# Patient Record
Sex: FEMALE | Race: WHITE | Employment: PART TIME | URBAN - METROPOLITAN AREA
[De-identification: names, ages, dates, MRNs, and addresses within clinical notes are randomized per-mention and may not be internally consistent; named-entity substitution may affect disease eponyms.]

---

## 2017-05-29 ENCOUNTER — HOSPITAL ENCOUNTER (OUTPATIENT)
Age: 22
Discharge: HOME | End: 2017-05-29
Attending: EMERGENCY MEDICINE
Payer: MEDICAID

## 2017-05-29 ENCOUNTER — APPOINTMENT (OUTPATIENT)
Dept: GENERAL RADIOLOGY | Age: 22
End: 2017-05-29
Attending: EMERGENCY MEDICINE
Payer: MEDICAID

## 2017-05-29 VITALS
HEART RATE: 77 BPM | SYSTOLIC BLOOD PRESSURE: 104 MMHG | RESPIRATION RATE: 18 BRPM | HEIGHT: 60 IN | WEIGHT: 118 LBS | OXYGEN SATURATION: 100 % | TEMPERATURE: 99 F | BODY MASS INDEX: 23.16 KG/M2 | DIASTOLIC BLOOD PRESSURE: 70 MMHG

## 2017-05-29 DIAGNOSIS — S63.501A RIGHT WRIST SPRAIN, INITIAL ENCOUNTER: Primary | ICD-10-CM

## 2017-05-29 PROCEDURE — 99213 OFFICE O/P EST LOW 20 MIN: CPT

## 2017-05-29 PROCEDURE — 99214 OFFICE O/P EST MOD 30 MIN: CPT

## 2017-05-29 PROCEDURE — 73110 X-RAY EXAM OF WRIST: CPT | Performed by: EMERGENCY MEDICINE

## 2017-05-29 RX ORDER — IBUPROFEN 600 MG/1
600 TABLET ORAL EVERY 8 HOURS PRN
Qty: 30 TABLET | Refills: 0 | Status: SHIPPED | OUTPATIENT
Start: 2017-05-29 | End: 2017-06-05

## 2017-05-29 NOTE — ED PROVIDER NOTES
Patient Seen in: [facility] Immediate Care In [address] [place] [address]    History   Patient presents with:  Upper Extremity Injury (musculoskeletal)    Stated Complaint: Rt Wrist Injury    HPI    Patient presents to the emergency department complaining of right w No        Physical Exam   Constitutional: She appears well-developed and well-nourished. No distress. Pulmonary/Chest: No respiratory distress. Musculoskeletal:   The right hand was examined.   There is tenderness over the ulnar metacarpal ligaments to

## 2018-08-07 ENCOUNTER — OFFICE VISIT (OUTPATIENT)
Dept: FAMILY MEDICINE CLINIC | Facility: CLINIC | Age: 23
End: 2018-08-07
Payer: COMMERCIAL

## 2018-08-07 VITALS
BODY MASS INDEX: 24.74 KG/M2 | HEART RATE: 70 BPM | DIASTOLIC BLOOD PRESSURE: 64 MMHG | TEMPERATURE: 98 F | RESPIRATION RATE: 16 BRPM | HEIGHT: 60 IN | SYSTOLIC BLOOD PRESSURE: 92 MMHG | OXYGEN SATURATION: 99 % | WEIGHT: 126 LBS

## 2018-08-07 DIAGNOSIS — N30.00 ACUTE CYSTITIS WITHOUT HEMATURIA: Primary | ICD-10-CM

## 2018-08-07 LAB
APPEARANCE: CLEAR
BILIRUBIN: NEGATIVE
GLUCOSE (URINE DIPSTICK): NEGATIVE MG/DL
KETONES (URINE DIPSTICK): NEGATIVE MG/DL
MULTISTIX LOT#: ABNORMAL NUMERIC
NITRITE, URINE: NEGATIVE
OCCULT BLOOD: NEGATIVE
PH, URINE: 7 (ref 4.5–8)
PROTEIN (URINE DIPSTICK): 30 MG/DL
SPECIFIC GRAVITY: 1.02 (ref 1–1.03)
URINE-COLOR: YELLOW
UROBILINOGEN,SEMI-QN: 0.2 MG/DL (ref 0–1.9)

## 2018-08-07 PROCEDURE — 99203 OFFICE O/P NEW LOW 30 MIN: CPT | Performed by: PHYSICIAN ASSISTANT

## 2018-08-07 PROCEDURE — 81003 URINALYSIS AUTO W/O SCOPE: CPT | Performed by: PHYSICIAN ASSISTANT

## 2018-08-07 PROCEDURE — 87086 URINE CULTURE/COLONY COUNT: CPT | Performed by: PHYSICIAN ASSISTANT

## 2018-08-07 RX ORDER — PHENAZOPYRIDINE HYDROCHLORIDE 200 MG/1
200 TABLET, FILM COATED ORAL 3 TIMES DAILY PRN
Qty: 6 TABLET | Refills: 0 | Status: SHIPPED | OUTPATIENT
Start: 2018-08-07 | End: 2018-08-09

## 2018-08-07 RX ORDER — CIPROFLOXACIN 500 MG/1
500 TABLET, FILM COATED ORAL 2 TIMES DAILY
Qty: 14 TABLET | Refills: 0 | Status: SHIPPED | OUTPATIENT
Start: 2018-08-07 | End: 2018-08-14

## 2018-08-07 NOTE — PROGRESS NOTES
CHIEF COMPLAINT:   Patient presents with:  UTI      HPI:   Carissa Simeon is a 21year old female who presents with symptoms of UTI. Complaining of urinary frequency, urgency, dysuria for last 7 days. +dark urine. Mild lower back pain.  No abdominal jazlyn hour(s))  -URINALYSIS, AUTO, W/O SCOPE   Collection Time: 08/07/18 12:27 PM   Result Value Ref Range   GLUCOSE (URINE DIPSTICK) negative mg/dL   BILIRUBIN negative Negative   KETONES (URINE DIPSTICK) negative Negative mg/dL   SPECIFIC GRAVITY 1.025 1.005 -

## 2019-02-26 ENCOUNTER — OFFICE VISIT (OUTPATIENT)
Dept: FAMILY MEDICINE CLINIC | Facility: CLINIC | Age: 24
End: 2019-02-26
Payer: COMMERCIAL

## 2019-02-26 VITALS
BODY MASS INDEX: 26 KG/M2 | SYSTOLIC BLOOD PRESSURE: 105 MMHG | WEIGHT: 131 LBS | RESPIRATION RATE: 16 BRPM | OXYGEN SATURATION: 99 % | DIASTOLIC BLOOD PRESSURE: 70 MMHG | HEART RATE: 77 BPM | TEMPERATURE: 98 F

## 2019-02-26 DIAGNOSIS — A08.4 VIRAL GASTROENTERITIS: Primary | ICD-10-CM

## 2019-02-26 DIAGNOSIS — J02.9 SORE THROAT: ICD-10-CM

## 2019-02-26 LAB
CONTROL LINE PRESENT WITH A CLEAR BACKGROUND (YES/NO): YES YES/NO
STREP GRP A CUL-SCR: NEGATIVE

## 2019-02-26 PROCEDURE — 99213 OFFICE O/P EST LOW 20 MIN: CPT | Performed by: NURSE PRACTITIONER

## 2019-02-26 PROCEDURE — 87081 CULTURE SCREEN ONLY: CPT | Performed by: NURSE PRACTITIONER

## 2019-02-26 PROCEDURE — 87880 STREP A ASSAY W/OPTIC: CPT | Performed by: NURSE PRACTITIONER

## 2019-02-26 NOTE — PROGRESS NOTES
CHIEF COMPLAINT:   Patient presents with:  Vomiting  Sore Throat      HPI:   Percy Vargas is a 21year old female who presents for complaints of vomiting, sore throat, and diarrhea for 1 day. Reports generalized mild cramping abdominal pain.   Report RESPIRATORY: denies shortness of breath, cough or wheezing  GI:See HPI  NEURO: denies headaches, loss of bowel or bladder control    EXAM:   /70   Pulse 77   Temp 97.8 °F (36.6 °C)   Resp 16   Wt 131 lb   LMP 02/12/2019   SpO2 99%   BMI 25.58 kg/m² Gastroenteritis is commonly called the stomach flu. It is most often caused by a virus that affects the stomach and intestinal tract and usually lasts from 2 to 7 days. Common viruses causing gastroenteritis include norovirus, rotavirus, and hepatitis A.  Tana Villatoro You may use acetaminophen or NSAID medicines like ibuprofen or naproxen to control fever unless another medicine was given. If you have chronic liver or kidney disease, talk with your healthcare provider before using these medicines.  Also talk with your pr · Limit fat intake to less than 15 grams per day. Do this by avoiding margarine, butter, oils, mayonnaise, sauces, gravies, fried foods, peanut butter, meat, poultry, and fish.   · Limit fiber and avoid raw or cooked vegetables, fresh fruits (except bananas · Try to drink lots of fluids. Start with small sips of fluid every 5 minutes, as long as you can keep fluid down, and increase slowly as tolerated. · Avoid soda and full strength fruit juices.   Try water, 1/2 strength apple juice, Pedialyte, and/or 1/

## 2019-02-26 NOTE — PATIENT INSTRUCTIONS
Viral Gastroenteritis (Adult)    Gastroenteritis is commonly called the stomach flu. It is most often caused by a virus that affects the stomach and intestinal tract and usually lasts from 2 to 7 days.  Common viruses causing gastroenteritis include norov You may use acetaminophen or NSAID medicines like ibuprofen or naproxen to control fever unless another medicine was given. If you have chronic liver or kidney disease, talk with your healthcare provider before using these medicines.  Also talk with your pr · Limit fat intake to less than 15 grams per day. Do this by avoiding margarine, butter, oils, mayonnaise, sauces, gravies, fried foods, peanut butter, meat, poultry, and fish.   · Limit fiber and avoid raw or cooked vegetables, fresh fruits (except bananas · Try to drink lots of fluids. Start with small sips of fluid every 5 minutes, as long as you can keep fluid down, and increase slowly as tolerated. · Avoid soda and full strength fruit juices.   Try water, 1/2 strength apple juice, Pedialyte, and/or 1/

## 2020-02-11 ENCOUNTER — OFFICE VISIT (OUTPATIENT)
Dept: NEUROLOGY | Facility: CLINIC | Age: 25
End: 2020-02-11
Payer: COMMERCIAL

## 2020-02-11 ENCOUNTER — TELEPHONE (OUTPATIENT)
Dept: NEUROLOGY | Facility: CLINIC | Age: 25
End: 2020-02-11

## 2020-02-11 VITALS
BODY MASS INDEX: 28 KG/M2 | DIASTOLIC BLOOD PRESSURE: 60 MMHG | SYSTOLIC BLOOD PRESSURE: 124 MMHG | RESPIRATION RATE: 16 BRPM | HEART RATE: 72 BPM | WEIGHT: 142 LBS

## 2020-02-11 DIAGNOSIS — G43.009 MIGRAINE WITHOUT AURA AND WITHOUT STATUS MIGRAINOSUS, NOT INTRACTABLE: Primary | ICD-10-CM

## 2020-02-11 DIAGNOSIS — F51.01 PRIMARY INSOMNIA: ICD-10-CM

## 2020-02-11 DIAGNOSIS — R53.82 CHRONIC FATIGUE: ICD-10-CM

## 2020-02-11 DIAGNOSIS — M54.2 NECK PAIN: ICD-10-CM

## 2020-02-11 DIAGNOSIS — G44.209 TENSION HEADACHE: ICD-10-CM

## 2020-02-11 PROCEDURE — 99204 OFFICE O/P NEW MOD 45 MIN: CPT | Performed by: OTHER

## 2020-02-11 RX ORDER — MULTIVITAMIN
TABLET ORAL
COMMUNITY

## 2020-02-11 RX ORDER — AMITRIPTYLINE HYDROCHLORIDE 10 MG/1
10 TABLET, FILM COATED ORAL NIGHTLY
Qty: 30 TABLET | Refills: 3 | Status: SHIPPED | OUTPATIENT
Start: 2020-02-11 | End: 2020-03-05

## 2020-02-11 RX ORDER — SUMATRIPTAN 50 MG/1
50 TABLET, FILM COATED ORAL EVERY 2 HOUR PRN
Qty: 12 TABLET | Refills: 3 | Status: SHIPPED | OUTPATIENT
Start: 2020-02-11 | End: 2020-03-05

## 2020-02-11 NOTE — PROGRESS NOTES
[facility]   Neurology; INITIAL CLINIC VISIT  2020, 2:39 PM     Cindy Gutierrez Patient Status:  No patient class for patient encounter    1995 MRN [id number]   Location [facility] denies chest pain, no palpitations   GI: denies nausea, vomiting, constipation, diarrhea; no rectal bleeding; no heartburn  GENITAL/: no dysuria, urgency or frequency;  no hernias; no nocturia  GYNE/: no dysuria, urgency or frequency; no urinary incont shrug  Motor:  No drift, rapid finger movement symmetric. 5/5 in all limbs with normal tone. No tremor of any kind;   Sensory; Intact to light touch, temperature, vibration and proprioception;  DTRs;   Symmetric in both arms and legs, including biceps, tri

## 2020-02-11 NOTE — PROGRESS NOTES
The patient states she is having headaches daily. Denies sensitives. Patient is noticing slurring of her words. She is also having pins and needles feeling in the neck and back of the head during her headaches. Patient states headaches started at 12.  Loyce Vidal

## 2020-02-20 ENCOUNTER — OFFICE VISIT (OUTPATIENT)
Dept: FAMILY MEDICINE CLINIC | Facility: CLINIC | Age: 25
End: 2020-02-20
Payer: COMMERCIAL

## 2020-02-20 VITALS
HEIGHT: 60 IN | RESPIRATION RATE: 18 BRPM | BODY MASS INDEX: 26.31 KG/M2 | TEMPERATURE: 98 F | OXYGEN SATURATION: 98 % | WEIGHT: 134 LBS | HEART RATE: 87 BPM | SYSTOLIC BLOOD PRESSURE: 107 MMHG | DIASTOLIC BLOOD PRESSURE: 76 MMHG

## 2020-02-20 DIAGNOSIS — A08.4 VIRAL GASTROENTERITIS: Primary | ICD-10-CM

## 2020-02-20 PROCEDURE — 99213 OFFICE O/P EST LOW 20 MIN: CPT | Performed by: NURSE PRACTITIONER

## 2020-02-20 RX ORDER — ONDANSETRON 4 MG/1
4 TABLET, ORALLY DISINTEGRATING ORAL EVERY 8 HOURS PRN
Qty: 9 TABLET | Refills: 0 | Status: SHIPPED | OUTPATIENT
Start: 2020-02-20 | End: 2020-02-23

## 2020-02-20 RX ORDER — TRETINOIN 0.5 MG/G
LOTION TOPICAL
COMMUNITY
Start: 2020-02-13

## 2020-02-20 NOTE — PROGRESS NOTES
CHIEF COMPLAINT:   Patient presents with:  Nausea/vomiting  Diarrhea      HPI:   Terese Bradley is a 25year old female who presents for complaints of diarrhea and vomiting for 2 days. Reports generalized mild cramping abdominal pain.   Reports vomited • SUMAtriptan Succinate (IMITREX) 50 MG Oral Tab Take 1 tablet (50 mg total) by mouth every 2 (two) hours as needed for Migraine. 12 tablet 3      History reviewed. No pertinent past medical history.    Social History:  Social History    Tobacco Use      Sm The patient is asked to f/u with PCP if symptoms worsen or if no improvement in 2-3 days. Discussed s/s that require ER visit.       Requested Prescriptions     Signed Prescriptions Disp Refills   • ondansetron 4 MG Oral Tablet Dispersible 9 tablet 0     S Gastroenteritis is commonly called the \"stomach flu,\" although it has nothing to do with influenza. It is most often caused by a virus that affects the stomach and intestinal tract and usually lasts from 2 to 7 days.  Common viruses causing gastroenteriti You may use acetaminophen or NSAID medicines like ibuprofen or naproxen to control fever unless another medicine was given. If you have chronic liver or kidney disease, talk with your healthcare provider before using these medicines.  Also talk with your pr · Limit fat intake to less than 15 grams per day. Do this by avoiding margarine, butter, oils, mayonnaise, sauces, gravies, fried foods, peanut butter, meat, poultry, and fish.   · Limit fiber and avoid raw or cooked vegetables, fresh fruits (except bananas

## 2020-02-20 NOTE — PATIENT INSTRUCTIONS
· May take Pepto-bismol or Imodium to slow diarrhea and minimize fluid loss through colon. · Rest as much as possible  · Try to drink lots of fluids. Start with small sips of water every 15 minutes as long as you can keep fluid down.     · Avoid fruit jui occurs, body fluids must be replaced. Antibiotics don't help with this illness because it is usually viral. Simple home treatment will be helpful.   Symptoms of viral gastroenteritis may include:  · Watery, loose stools  · Stomach pain or abdominal cramps medicine for vomiting or diarrhea are prescribed, take these only as directed. Nausea and diarrhea medicines are generally OK unless you have bleeding, fever, or severe abdominal pain.   Diet  Follow these guidelines for food:  · Water and liquids are impor better. Follow-up care  Follow up with your healthcare provider, or as advised. Call your provider if you don't get better within 24 hours or if diarrhea lasts more than a week. Also follow up if you are unable to keep down liquids and get dehydrated.  If

## 2020-03-05 ENCOUNTER — OFFICE VISIT (OUTPATIENT)
Dept: NEUROLOGY | Facility: CLINIC | Age: 25
End: 2020-03-05
Payer: COMMERCIAL

## 2020-03-05 VITALS
BODY MASS INDEX: 27 KG/M2 | SYSTOLIC BLOOD PRESSURE: 118 MMHG | HEART RATE: 66 BPM | WEIGHT: 138 LBS | RESPIRATION RATE: 14 BRPM | DIASTOLIC BLOOD PRESSURE: 70 MMHG

## 2020-03-05 DIAGNOSIS — G43.009 MIGRAINE WITHOUT AURA AND WITHOUT STATUS MIGRAINOSUS, NOT INTRACTABLE: Primary | ICD-10-CM

## 2020-03-05 DIAGNOSIS — F51.01 PRIMARY INSOMNIA: ICD-10-CM

## 2020-03-05 DIAGNOSIS — R53.82 CHRONIC FATIGUE: ICD-10-CM

## 2020-03-05 PROCEDURE — 99214 OFFICE O/P EST MOD 30 MIN: CPT | Performed by: OTHER

## 2020-03-05 RX ORDER — TOPIRAMATE 25 MG/1
25 TABLET ORAL 2 TIMES DAILY
Qty: 60 TABLET | Refills: 5 | Status: SHIPPED | OUTPATIENT
Start: 2020-03-05 | End: 2020-11-05

## 2020-03-05 RX ORDER — SUMATRIPTAN 50 MG/1
50 TABLET, FILM COATED ORAL EVERY 2 HOUR PRN
Qty: 12 TABLET | Refills: 3 | Status: SHIPPED | OUTPATIENT
Start: 2020-03-05 | End: 2021-12-23

## 2020-03-05 NOTE — PROGRESS NOTES
[facility]   Neurology; follow up  CLINIC VISIT  3/5/2020    Abdullahi Vicente Patient Status:  No patient class for patient encounter    1995 MRN [id number]   Location [address] [place] [address], [address] [place] [address], [address] [place] [address] PCP N Take 1 tablet (50 mg total) by mouth every 2 (two) hours as needed for Migraine. 12 tablet 3   • ALTRENO 0.05 % External Lotion      • Multiple Vitamin (MULTI-VITAMIN DAILY) Oral Tab Take by mouth.            REVIEW OF SYSTEMS:    GENERAL HEALTH:  feels wel well;  Memory and comprehension:  MMSE is normal,   Cranial Nerves       CN II:  Visual fields full, Pupils equal and reactive, Fundi normal       CN III, IV, VI:  Horrizontal and vertical movements normal.  Normal pursuit and saccades. questions and concerns were addressed to satisfactory status. The patient was instructed to go to local ER if current symptoms worse or significant new symptoms arise. They understood my instruction.      Sarah Gardiner MD  General Neurology   Neuromuscular/ El

## 2021-12-01 ENCOUNTER — OFFICE VISIT (OUTPATIENT)
Dept: DERMATOLOGY CLINIC | Facility: CLINIC | Age: 26
End: 2021-12-01
Payer: MEDICAID

## 2021-12-01 DIAGNOSIS — L70.0 ACNE VULGARIS: Primary | ICD-10-CM

## 2021-12-01 PROCEDURE — 99213 OFFICE O/P EST LOW 20 MIN: CPT | Performed by: PHYSICIAN ASSISTANT

## 2021-12-01 RX ORDER — CLINDAMYCIN PHOSPHATE 10 MG/ML
1 LOTION TOPICAL DAILY
Qty: 60 ML | Refills: 1 | Status: SHIPPED | OUTPATIENT
Start: 2021-12-01

## 2021-12-01 NOTE — PROGRESS NOTES
HPI:    Patient ID: Erasmo Esquivel is a 32year old female. Patient presents for acne for the past several months. Has been seen in dermatology before and was given acutane. Has noticed most of her acne is around the nose and lips. No draining.  No ten will comply with discussion above. No orders of the defined types were placed in this encounter.       Meds This Visit:  Requested Prescriptions     Signed Prescriptions Disp Refills   • clindamycin 1 % External Lotion 60 mL 1     Sig: Apply 1 Applicati

## 2021-12-07 ENCOUNTER — TELEPHONE (OUTPATIENT)
Dept: DERMATOLOGY CLINIC | Facility: CLINIC | Age: 26
End: 2021-12-07

## 2021-12-09 NOTE — TELEPHONE ENCOUNTER
Medication PA Requested:    Tretinoin 0.05% External Cream                                                       CoverMyMeds Used:  Key:  Quantity:  20g  Day Supply:  Sig:   DX Code:                                     CPT code (if applicable):   Case Number/Pending Ref#:       Medication PA Requested:   Clindamycin 1.0% External Lotion                                                         CoverMyMeds Used:  Key:  Quantity:  60 ml  Day Supply:  Sig:   DX Code:                                     CPT code (if applicable):   Case Number/Pending Ref#:     Thank you!

## 2021-12-14 NOTE — TELEPHONE ENCOUNTER
Fax from [facility] for clindamycin 1 % External Lotion    Denied Tretinoin 0.05 % External Cream    Placed faxes in pa inbox

## 2021-12-23 PROBLEM — M54.2 NECK PAIN: Status: RESOLVED | Noted: 2020-02-11 | Resolved: 2021-12-23

## 2021-12-23 PROBLEM — G43.009 MIGRAINE WITHOUT AURA: Status: RESOLVED | Noted: 2020-02-11 | Resolved: 2021-12-23

## 2022-10-10 ENCOUNTER — HOSPITAL ENCOUNTER (OUTPATIENT)
Age: 27
Discharge: HOME | End: 2022-10-10
Payer: COMMERCIAL

## 2022-10-10 VITALS
DIASTOLIC BLOOD PRESSURE: 62 MMHG | OXYGEN SATURATION: 98 % | TEMPERATURE: 97 F | WEIGHT: 130 LBS | HEIGHT: 60 IN | HEART RATE: 89 BPM | SYSTOLIC BLOOD PRESSURE: 148 MMHG | RESPIRATION RATE: 16 BRPM | BODY MASS INDEX: 25.52 KG/M2

## 2022-10-10 DIAGNOSIS — J02.0 STREP PHARYNGITIS: Primary | ICD-10-CM

## 2022-10-10 LAB — S PYO AG THROAT QL: POSITIVE

## 2022-10-10 PROCEDURE — 99213 OFFICE O/P EST LOW 20 MIN: CPT | Performed by: NURSE PRACTITIONER

## 2022-10-10 PROCEDURE — 87880 STREP A ASSAY W/OPTIC: CPT | Performed by: NURSE PRACTITIONER

## 2022-10-10 RX ORDER — PENICILLIN V POTASSIUM 500 MG/1
500 TABLET ORAL 2 TIMES DAILY
Qty: 20 TABLET | Refills: 0 | Status: SHIPPED | OUTPATIENT
Start: 2022-10-10 | End: 2022-10-20

## 2022-10-10 NOTE — ED INITIAL ASSESSMENT (HPI)
Pt reports sore throat, headache, and stiff neck which started Saturday morning. Febrile, took tylenol at 0500.

## 2022-10-17 ENCOUNTER — HOSPITAL ENCOUNTER (OUTPATIENT)
Age: 27
Discharge: HOME | End: 2022-10-17
Payer: COMMERCIAL

## 2022-10-17 ENCOUNTER — APPOINTMENT (OUTPATIENT)
Dept: GENERAL RADIOLOGY | Age: 27
End: 2022-10-17
Attending: NURSE PRACTITIONER
Payer: COMMERCIAL

## 2022-10-17 VITALS
OXYGEN SATURATION: 100 % | HEART RATE: 66 BPM | RESPIRATION RATE: 16 BRPM | DIASTOLIC BLOOD PRESSURE: 61 MMHG | TEMPERATURE: 98 F | SYSTOLIC BLOOD PRESSURE: 122 MMHG

## 2022-10-17 DIAGNOSIS — M54.50 BACK PAIN, LUMBOSACRAL: Primary | ICD-10-CM

## 2022-10-17 LAB
B-HCG UR QL: NEGATIVE
BILIRUB UR QL STRIP: NEGATIVE
CLARITY UR: CLEAR
COLOR UR: YELLOW
GLUCOSE UR STRIP-MCNC: NEGATIVE MG/DL
HGB UR QL STRIP: NEGATIVE
KETONES UR STRIP-MCNC: NEGATIVE MG/DL
LEUKOCYTE ESTERASE UR QL STRIP: NEGATIVE
NITRITE UR QL STRIP: NEGATIVE
PH UR STRIP: 5.5 [PH]
PROT UR STRIP-MCNC: NEGATIVE MG/DL
SP GR UR STRIP: 1.01
UROBILINOGEN UR STRIP-ACNC: <2 MG/DL

## 2022-10-17 PROCEDURE — 81025 URINE PREGNANCY TEST: CPT | Performed by: NURSE PRACTITIONER

## 2022-10-17 PROCEDURE — 99213 OFFICE O/P EST LOW 20 MIN: CPT | Performed by: NURSE PRACTITIONER

## 2022-10-17 PROCEDURE — 81002 URINALYSIS NONAUTO W/O SCOPE: CPT | Performed by: NURSE PRACTITIONER

## 2022-10-17 PROCEDURE — 72100 X-RAY EXAM L-S SPINE 2/3 VWS: CPT | Performed by: NURSE PRACTITIONER

## 2022-10-17 RX ORDER — IBUPROFEN 600 MG/1
600 TABLET ORAL EVERY 8 HOURS PRN
Qty: 30 TABLET | Refills: 0 | Status: SHIPPED | OUTPATIENT
Start: 2022-10-17 | End: 2022-10-24

## 2022-10-22 ENCOUNTER — HOSPITAL ENCOUNTER (EMERGENCY)
Facility: HOSPITAL | Age: 27
Discharge: HOME | End: 2022-10-22
Attending: EMERGENCY MEDICINE
Payer: COMMERCIAL

## 2022-10-22 VITALS
HEIGHT: 60 IN | RESPIRATION RATE: 16 BRPM | HEART RATE: 66 BPM | WEIGHT: 130 LBS | DIASTOLIC BLOOD PRESSURE: 64 MMHG | OXYGEN SATURATION: 98 % | TEMPERATURE: 98 F | SYSTOLIC BLOOD PRESSURE: 109 MMHG | BODY MASS INDEX: 25.52 KG/M2

## 2022-10-22 DIAGNOSIS — M54.42 ACUTE LEFT-SIDED LOW BACK PAIN WITH LEFT-SIDED SCIATICA: Primary | ICD-10-CM

## 2022-10-22 PROCEDURE — 99283 EMERGENCY DEPT VISIT LOW MDM: CPT

## 2022-10-22 RX ORDER — CYCLOBENZAPRINE HCL 10 MG
10 TABLET ORAL 3 TIMES DAILY PRN
Qty: 20 TABLET | Refills: 0 | Status: SHIPPED | OUTPATIENT
Start: 2022-10-22 | End: 2022-10-22

## 2022-10-22 RX ORDER — CYCLOBENZAPRINE HCL 10 MG
10 TABLET ORAL 3 TIMES DAILY PRN
Qty: 20 TABLET | Refills: 0 | Status: SHIPPED | OUTPATIENT
Start: 2022-10-22 | End: 2022-10-29

## 2022-10-22 RX ORDER — HYDROCODONE BITARTRATE AND ACETAMINOPHEN 5; 325 MG/1; MG/1
1 TABLET ORAL EVERY 8 HOURS PRN
Qty: 12 TABLET | Refills: 0 | Status: SHIPPED | OUTPATIENT
Start: 2022-10-22 | End: 2022-10-22

## 2022-10-22 RX ORDER — HYDROCODONE BITARTRATE AND ACETAMINOPHEN 5; 325 MG/1; MG/1
1 TABLET ORAL EVERY 8 HOURS PRN
Qty: 12 TABLET | Refills: 0 | Status: SHIPPED | OUTPATIENT
Start: 2022-10-22

## 2022-10-23 NOTE — ED INITIAL ASSESSMENT (HPI)
Pt reports low back pain for past week and a half, now reports pain radiating to L groin and down L leg.

## 2023-03-08 ENCOUNTER — TELEPHONE (OUTPATIENT)
Dept: OBGYN | Age: 28
End: 2023-03-08

## 2023-07-26 ENCOUNTER — HOSPITAL ENCOUNTER (EMERGENCY)
Facility: HOSPITAL | Age: 28
Discharge: HOME | End: 2023-07-26
Attending: EMERGENCY MEDICINE
Payer: COMMERCIAL

## 2023-07-26 ENCOUNTER — APPOINTMENT (OUTPATIENT)
Dept: GENERAL RADIOLOGY | Facility: HOSPITAL | Age: 28
End: 2023-07-26
Attending: EMERGENCY MEDICINE
Payer: COMMERCIAL

## 2023-07-26 VITALS
OXYGEN SATURATION: 98 % | HEART RATE: 67 BPM | RESPIRATION RATE: 16 BRPM | SYSTOLIC BLOOD PRESSURE: 108 MMHG | DIASTOLIC BLOOD PRESSURE: 82 MMHG | BODY MASS INDEX: 26.5 KG/M2 | HEIGHT: 60 IN | WEIGHT: 135 LBS | TEMPERATURE: 99 F

## 2023-07-26 DIAGNOSIS — R07.9 CHEST PAIN OF UNCERTAIN ETIOLOGY: Primary | ICD-10-CM

## 2023-07-26 LAB
ALBUMIN SERPL-MCNC: 3.9 G/DL (ref 3.4–5)
ALBUMIN/GLOB SERPL: 1 {RATIO} (ref 1–2)
ALP LIVER SERPL-CCNC: 49 U/L
ALT SERPL-CCNC: 15 U/L
ANION GAP SERPL CALC-SCNC: 7 MMOL/L (ref 0–18)
AST SERPL-CCNC: 15 U/L (ref 15–37)
B-HCG UR QL: NEGATIVE
BASOPHILS # BLD AUTO: 0.06 X10(3) UL (ref 0–0.2)
BASOPHILS NFR BLD AUTO: 0.6 %
BILIRUB SERPL-MCNC: 0.6 MG/DL (ref 0.1–2)
BUN BLD-MCNC: 6 MG/DL (ref 7–18)
BUN/CREAT SERPL: 8.1 (ref 10–20)
CALCIUM BLD-MCNC: 9.1 MG/DL (ref 8.5–10.1)
CHLORIDE SERPL-SCNC: 108 MMOL/L (ref 98–112)
CO2 SERPL-SCNC: 24 MMOL/L (ref 21–32)
CREAT BLD-MCNC: 0.74 MG/DL
DEPRECATED RDW RBC AUTO: 44 FL (ref 35.1–46.3)
EGFRCR SERPLBLD CKD-EPI 2021: 113 ML/MIN/1.73M2 (ref 60–?)
EOSINOPHIL # BLD AUTO: 0.11 X10(3) UL (ref 0–0.7)
EOSINOPHIL NFR BLD AUTO: 1.1 %
ERYTHROCYTE [DISTWIDTH] IN BLOOD BY AUTOMATED COUNT: 15.3 % (ref 11–15)
GLOBULIN PLAS-MCNC: 3.9 G/DL (ref 2.8–4.4)
GLUCOSE BLD-MCNC: 109 MG/DL (ref 70–99)
HCT VFR BLD AUTO: 42.8 %
HGB BLD-MCNC: 13.8 G/DL
IMM GRANULOCYTES # BLD AUTO: 0.02 X10(3) UL (ref 0–1)
IMM GRANULOCYTES NFR BLD: 0.2 %
LYMPHOCYTES # BLD AUTO: 2.79 X10(3) UL (ref 1–4)
LYMPHOCYTES NFR BLD AUTO: 28.1 %
MCH RBC QN AUTO: 25.8 PG (ref 26–34)
MCHC RBC AUTO-ENTMCNC: 32.2 G/DL (ref 31–37)
MCV RBC AUTO: 80 FL
MONOCYTES # BLD AUTO: 0.95 X10(3) UL (ref 0.1–1)
MONOCYTES NFR BLD AUTO: 9.6 %
NEUTROPHILS # BLD AUTO: 5.99 X10 (3) UL (ref 1.5–7.7)
NEUTROPHILS # BLD AUTO: 5.99 X10(3) UL (ref 1.5–7.7)
NEUTROPHILS NFR BLD AUTO: 60.4 %
OSMOLALITY SERPL CALC.SUM OF ELEC: 286 MOSM/KG (ref 275–295)
PLATELET # BLD AUTO: 421 10(3)UL (ref 150–450)
POTASSIUM SERPL-SCNC: 3.8 MMOL/L (ref 3.5–5.1)
PROT SERPL-MCNC: 7.8 G/DL (ref 6.4–8.2)
RBC # BLD AUTO: 5.35 X10(6)UL
SODIUM SERPL-SCNC: 139 MMOL/L (ref 136–145)
TROPONIN I HIGH SENSITIVITY: 4 NG/L
WBC # BLD AUTO: 9.9 X10(3) UL (ref 4–11)

## 2023-07-26 PROCEDURE — 99285 EMERGENCY DEPT VISIT HI MDM: CPT

## 2023-07-26 PROCEDURE — 93005 ELECTROCARDIOGRAM TRACING: CPT

## 2023-07-26 PROCEDURE — 80053 COMPREHEN METABOLIC PANEL: CPT | Performed by: EMERGENCY MEDICINE

## 2023-07-26 PROCEDURE — 99284 EMERGENCY DEPT VISIT MOD MDM: CPT

## 2023-07-26 PROCEDURE — 85025 COMPLETE CBC W/AUTO DIFF WBC: CPT | Performed by: EMERGENCY MEDICINE

## 2023-07-26 PROCEDURE — 81025 URINE PREGNANCY TEST: CPT

## 2023-07-26 PROCEDURE — 36415 COLL VENOUS BLD VENIPUNCTURE: CPT

## 2023-07-26 PROCEDURE — 84484 ASSAY OF TROPONIN QUANT: CPT | Performed by: EMERGENCY MEDICINE

## 2023-07-26 PROCEDURE — 71045 X-RAY EXAM CHEST 1 VIEW: CPT | Performed by: EMERGENCY MEDICINE

## 2023-07-26 PROCEDURE — 93010 ELECTROCARDIOGRAM REPORT: CPT

## 2023-07-26 RX ORDER — IBUPROFEN 600 MG/1
600 TABLET ORAL ONCE
Status: COMPLETED | OUTPATIENT
Start: 2023-07-26 | End: 2023-07-26

## 2023-07-26 NOTE — ED INITIAL ASSESSMENT (HPI)
Starting last night mid-upper chest pain radiating to left side, constant and getting worse throughout the day. +nausea, denies dizziness or vomiting.   Pain on inspiration, denies trauma

## 2023-07-27 LAB
ATRIAL RATE: 69 BPM
P AXIS: 55 DEGREES
P-R INTERVAL: 126 MS
Q-T INTERVAL: 426 MS
QRS DURATION: 76 MS
QTC CALCULATION (BEZET): 456 MS
R AXIS: 27 DEGREES
T AXIS: 40 DEGREES
VENTRICULAR RATE: 69 BPM

## 2023-11-27 ENCOUNTER — OFFICE VISIT (OUTPATIENT)
Dept: OBGYN CLINIC | Facility: CLINIC | Age: 28
End: 2023-11-27
Payer: MEDICAID

## 2023-11-27 ENCOUNTER — LAB ENCOUNTER (OUTPATIENT)
Dept: LAB | Age: 28
End: 2023-11-27
Attending: ADVANCED PRACTICE MIDWIFE
Payer: COMMERCIAL

## 2023-11-27 VITALS
SYSTOLIC BLOOD PRESSURE: 126 MMHG | DIASTOLIC BLOOD PRESSURE: 78 MMHG | WEIGHT: 142.13 LBS | HEIGHT: 60 IN | BODY MASS INDEX: 27.9 KG/M2

## 2023-11-27 DIAGNOSIS — Z01.419 WELL WOMAN EXAM: ICD-10-CM

## 2023-11-27 DIAGNOSIS — Z11.3 SCREENING EXAMINATION FOR STD (SEXUALLY TRANSMITTED DISEASE): ICD-10-CM

## 2023-11-27 DIAGNOSIS — Z12.4 CERVICAL CANCER SCREENING: ICD-10-CM

## 2023-11-27 DIAGNOSIS — Z01.419 ENCOUNTER FOR ANNUAL ROUTINE GYNECOLOGICAL EXAMINATION: Primary | ICD-10-CM

## 2023-11-27 LAB
HBV SURFACE AG SER-ACNC: <0.1 [IU]/L
HBV SURFACE AG SERPL QL IA: NONREACTIVE
HCV AB SERPL QL IA: NONREACTIVE

## 2023-11-27 PROCEDURE — 87491 CHLMYD TRACH DNA AMP PROBE: CPT | Performed by: ADVANCED PRACTICE MIDWIFE

## 2023-11-27 PROCEDURE — 86803 HEPATITIS C AB TEST: CPT | Performed by: ADVANCED PRACTICE MIDWIFE

## 2023-11-27 PROCEDURE — 87591 N.GONORRHOEAE DNA AMP PROB: CPT | Performed by: ADVANCED PRACTICE MIDWIFE

## 2023-11-27 PROCEDURE — 81514 NFCT DS BV&VAGINITIS DNA ALG: CPT | Performed by: ADVANCED PRACTICE MIDWIFE

## 2023-11-27 PROCEDURE — 86780 TREPONEMA PALLIDUM: CPT | Performed by: ADVANCED PRACTICE MIDWIFE

## 2023-11-27 PROCEDURE — 87389 HIV-1 AG W/HIV-1&-2 AB AG IA: CPT | Performed by: ADVANCED PRACTICE MIDWIFE

## 2023-11-27 PROCEDURE — 87340 HEPATITIS B SURFACE AG IA: CPT | Performed by: ADVANCED PRACTICE MIDWIFE

## 2023-11-27 NOTE — PROGRESS NOTES
Chief Complaint:   Chief Complaint   Patient presents with    Gyn Exam     annual        HPI:     Winsome Hernandez is 29year old female, here today for annual exam  Patient's last menstrual period was 2023 (exact date). . Menses regular, every month. Hx Prior Abnormal Pap: No   Denies abnormal discharge or vaginal irritation. Current Partners: none; recently ended relationship as found out partner had been unfaithful  Birth control method: condoms.  Happy with this method  Last STI screen:   Desires this testing today  Additional information:       Last Pap: maybe       H/O abnormal Pap: no  No family history of breast or ovarian cancer        HISTORY:  Past Medical History:   Diagnosis Date    Acne vulgaris     see's dermatology    Migraine headache     Tension headache       Past Surgical History:   Procedure Laterality Date            Family History   Problem Relation Age of Onset    Migraines Brother     No Known Problems Mother     Crohn's Disease Father     No Known Problems Daughter     Heart Attack Maternal Grandfather     Diabetes Maternal Grandfather     Stroke Paternal Grandmother       Social History:   Social History     Socioeconomic History    Marital status: Single    Number of children: 1   Occupational History    Occupation: [employer]     Comment: [employer]   Tobacco Use    Smoking status: Never    Smokeless tobacco: Never   Vaping Use    Vaping Use: Never used   Substance and Sexual Activity    Alcohol use: Yes     Comment: 1-2 drinks/month    Drug use: No    Sexual activity: Not Currently   Other Topics Concern    Reaction to local anesthetic No    Caffeine Concern Yes     Comment: soda and tea    Exercise Yes     Comment: 3-4 times a week     Special Diet Yes     Comment: not bad but could be better        Medications (Active prior to today's visit):  Current Outpatient Medications   Medication Sig Dispense Refill    Tretinoin 0.05 % External Cream Apply to face nightly as tolerated 20 g 2    Multiple Vitamin (MULTI-VITAMIN DAILY) Oral Tab Take by mouth. clindamycin 1 % External Lotion Apply 1 Application topically daily. 60 mL 1    ALTRENO 0.05 % External Lotion          Allergies:  No Known Allergies    HISTORY:  Past Medical History:   Diagnosis Date    Acne vulgaris     see's dermatology    Migraine headache     Tension headache       Past Surgical History:   Procedure Laterality Date            Family History   Problem Relation Age of Onset    Migraines Brother     No Known Problems Mother     Crohn's Disease Father     No Known Problems Daughter     Heart Attack Maternal Grandfather     Diabetes Maternal Grandfather     Stroke Paternal Grandmother       Social History:   Social History     Socioeconomic History    Marital status: Single    Number of children: 1   Occupational History    Occupation: [employer]     Comment: [employer]   Tobacco Use    Smoking status: Never    Smokeless tobacco: Never   Vaping Use    Vaping Use: Never used   Substance and Sexual Activity    Alcohol use: Yes     Comment: 1-2 drinks/month    Drug use: No    Sexual activity: Not Currently   Other Topics Concern    Reaction to local anesthetic No    Caffeine Concern Yes     Comment: soda and tea    Exercise Yes     Comment: 3-4 times a week     Special Diet Yes     Comment: not bad but could be better        Medications (Active prior to today's visit):  Current Outpatient Medications   Medication Sig Dispense Refill    Tretinoin 0.05 % External Cream Apply to face nightly as tolerated 20 g 2    Multiple Vitamin (MULTI-VITAMIN DAILY) Oral Tab Take by mouth. clindamycin 1 % External Lotion Apply 1 Application topically daily. 60 mL 1    ALTRENO 0.05 % External Lotion          Allergies:  No Known Allergies       ROS:     Cardiovascular:  Negative forirregular heartbeat/palpitations.  Negative for chest pain  Constitutional:  Negative for decreased activity, fever, irritability and lethargy  Gastrointestinal:  Negative for abdominal pain, constipation, decreased appetite, diarrhea and vomiting  Genitourinary:  Negative for dysuria and hematuria  Reproductive: Negative for vaginal discharge, itching, abnormal bleeding  Hema/Lymph:  Negative for easy bleeding and easy bruising  Neurological:  Negative for gait disturbance or dizziness  Psychiatric:  Negative for inappropriate interaction and psychiatric symptoms  Respiratory:  Negative for cough, dyspnea and wheezing      PHYSICAL EXAM:   Constitutional: appears well hydrated alert and responsive no acute distress noted  Neck/Thyroid: neck is supple without adenopathy No thyromegaly  Lymphatic: no abnormal cervical, supraclavicular or axillary adenopathy is noted  Breast: normal on inspection without masses  Respiratory: normal to inspection lungs are clear to auscultation bilaterally normal respiratory effort  Cardiovascular: regular rate and rhythm no murmurs, gallups, or rubs  Abdomen: soft non-tender non-distended no organomegaly noted no masses  Genitourinary: normal female genitalia            Vulva/Labia: Appear normal, no lesions           Vagina: Normal, no discharge           Cervix: No CMT, no mucopurulent discharge  Rectal: anus appears normal  Lymphatics: No lymphadenopathy neck, axilla, groin  Musculoskeletal: Normal strength, no swelling  Integumentary: Warm, Dry, appropriate color, Intact  Neurologic: Alert, Oriented  Psychiatric: Cooperative, appropriate mood and affect            ASSESSMENT/PLAN:   Assessment   Encounter Diagnoses   Name Primary?     Well woman exam     Screening examination for STD (sexually transmitted disease)     Encounter for annual routine gynecological examination Yes    Cervical cancer screening        Normal gyne exam. Pap  to lab/  STD testing- GC/CT, HIV, Treponemal, Hep B ordered  Diet/excercise discussed  Breast self awareness discussed         Orders This Visit:  Orders Placed This Encounter   Procedures    TREP    HCV Antibody    HIV AG AB Combo    Hepatitis B Surface Antigen    ThinPrep PAP with HPV Reflex Request [E]    ThinPrep PAP with HPV Reflex Request    Chlamydia/GC PCR Combo    Vaginitis Vaginosis PCR Panel       Meds This Visit:  Requested Prescriptions      No prescriptions requested or ordered in this encounter       Imaging & Referrals:  None     11/27/2023  Kaia Guillaume CNM

## 2023-11-28 ENCOUNTER — TELEPHONE (OUTPATIENT)
Dept: OBGYN CLINIC | Facility: CLINIC | Age: 28
End: 2023-11-28

## 2023-11-28 LAB
BV BACTERIA DNA VAG QL NAA+PROBE: POSITIVE
C GLABRATA DNA VAG QL NAA+PROBE: NEGATIVE
C KRUSEI DNA VAG QL NAA+PROBE: NEGATIVE
CANDIDA DNA VAG QL NAA+PROBE: NEGATIVE
T VAGINALIS DNA VAG QL NAA+PROBE: NEGATIVE

## 2023-11-28 RX ORDER — METRONIDAZOLE 500 MG/1
500 TABLET ORAL 2 TIMES DAILY
Qty: 14 TABLET | Refills: 0 | Status: SHIPPED | OUTPATIENT
Start: 2023-11-28 | End: 2023-12-05

## 2023-11-28 NOTE — TELEPHONE ENCOUNTER
----- Message from Nabila Carroll CNM sent at 11/28/2023  3:07 PM CST -----  Please call patient. She has BV. I have sent flagyl to her pharmacy. This can be changed to metrogel if she prefers. I will be in touch when remaining results are received. Thanks!

## 2023-11-29 LAB
C TRACH DNA SPEC QL NAA+PROBE: POSITIVE
N GONORRHOEA DNA SPEC QL NAA+PROBE: NEGATIVE
T PALLIDUM AB SER QL: NEGATIVE

## 2023-11-29 RX ORDER — DOXYCYCLINE HYCLATE 100 MG/1
100 CAPSULE ORAL 2 TIMES DAILY
Qty: 14 CAPSULE | Refills: 0 | Status: SHIPPED | OUTPATIENT
Start: 2023-11-29 | End: 2023-12-06

## 2023-11-29 NOTE — TELEPHONE ENCOUNTER
Patient verified name and   Patient with positive Chlamydia result on   Patient counseled on results and need for treatment, notification of partners and partner treatment. Notified of BV results as well and Flagyl Rx. Treated per protocol: Yes    (Doxycycline 100 mg p.o BID x 7 days- GYN)  Rx sent to pharmacy. EPT offered. Patient declined   Patient offered full STI panel. Patient already completed in process   Advised of need for repeat testing in: 3 months   Advised to abstain from sex until both patient and partner fully treated. IDPH form filled out and faxed. Verbalizes understanding and agrees with plan.

## 2023-11-30 LAB
HPV I/H RISK 1 DNA SPEC QL NAA+PROBE: POSITIVE
HPV16 DNA CVX QL PROBE+SIG AMP: NEGATIVE
HPV18 DNA CVX QL PROBE+SIG AMP: NEGATIVE

## 2023-12-01 ENCOUNTER — TELEPHONE (OUTPATIENT)
Dept: OBGYN CLINIC | Facility: CLINIC | Age: 28
End: 2023-12-01

## 2023-12-01 NOTE — TELEPHONE ENCOUNTER
----- Message from Raymon Cho CNM sent at 12/1/2023 11:00 AM CST -----  Please add patient to the pap follow-up book for one year from now. Thanks!

## 2024-03-21 ENCOUNTER — APPOINTMENT (OUTPATIENT)
Dept: URBAN - METROPOLITAN AREA CLINIC 240 | Age: 29
Setting detail: DERMATOLOGY
End: 2024-03-21

## 2024-03-21 DIAGNOSIS — Z41.9 ENCOUNTER FOR PROCEDURE FOR PURPOSES OTHER THAN REMEDYING HEALTH STATE, UNSPECIFIED: ICD-10-CM

## 2024-03-21 PROCEDURE — OTHER COSMETIC CONSULTATION: LASER RESURFACING: OTHER

## 2024-03-21 PROCEDURE — OTHER MIPS QUALITY: OTHER

## 2024-12-11 ENCOUNTER — HOSPITAL ENCOUNTER (OUTPATIENT)
Age: 29
Discharge: HOME | End: 2024-12-11
Payer: MEDICAID

## 2024-12-11 VITALS
RESPIRATION RATE: 18 BRPM | HEART RATE: 83 BPM | DIASTOLIC BLOOD PRESSURE: 61 MMHG | OXYGEN SATURATION: 100 % | SYSTOLIC BLOOD PRESSURE: 115 MMHG | TEMPERATURE: 99 F

## 2024-12-11 DIAGNOSIS — H01.001 BLEPHARITIS OF UPPER EYELIDS OF BOTH EYES, UNSPECIFIED TYPE: Primary | ICD-10-CM

## 2024-12-11 DIAGNOSIS — H01.004 BLEPHARITIS OF UPPER EYELIDS OF BOTH EYES, UNSPECIFIED TYPE: Primary | ICD-10-CM

## 2024-12-11 PROCEDURE — 99213 OFFICE O/P EST LOW 20 MIN: CPT | Performed by: NURSE PRACTITIONER

## 2024-12-11 RX ORDER — ERYTHROMYCIN 5 MG/G
1 OINTMENT OPHTHALMIC EVERY 6 HOURS
Qty: 1 G | Refills: 0 | Status: SHIPPED | OUTPATIENT
Start: 2024-12-11 | End: 2024-12-18

## 2024-12-12 NOTE — ED PROVIDER NOTES
Patient Seen in: [facility] [place]    History   CC: eyelid redness  HPI: Silvia Alvarez 29 year old female  who presents c/o bilat upper eyelid irritation and redness with mild swelling today. Denies itching associated. Denies vision changes, eye injury/trauma. +wear monthly contacts. Denies URI s/s, eye dx or eye itself complaints.     Past Medical History:    Acne vulgaris    see's dermatology    Migraine headache    Tension headache       Past Surgical History:   Procedure Laterality Date             Family History   Problem Relation Age of Onset    Migraines Brother     No Known Problems Mother     Crohn's Disease Father     No Known Problems Daughter     Heart Attack Maternal Grandfather     Diabetes Maternal Grandfather     Stroke Paternal Grandmother        Social History     Socioeconomic History    Marital status: Single    Number of children: 1   Occupational History    Occupation: [employer]     Comment: [employer]   Tobacco Use    Smoking status: Never    Smokeless tobacco: Never   Vaping Use    Vaping status: Never Used   Substance and Sexual Activity    Alcohol use: Yes     Comment: 1-2 drinks/month    Drug use: No    Sexual activity: Not Currently   Other Topics Concern    Reaction to local anesthetic No    Caffeine Concern Yes     Comment: soda and tea    Exercise Yes     Comment: 3-4 times a week     Special Diet Yes     Comment: not bad but could be better       ROS:  Systems reviewed: All pertinent positives noted in HPI. Unless otherwise noted, additional systems reviewed are negative.   Vital signs reviewed.    Positive for stated complaint: eye(s) issue  Other systems are as noted in HPI.  Constitutional and vital signs reviewed.      All other systems reviewed and negative except as noted above.    PSFH elements reviewed from today and agreed except as otherwise stated in HPI.             Constitutional and vital signs reviewed.        Physical Exam     ED  Triage Vitals [12/11/24 1805]   /61   Pulse 83   Resp 18   Temp 98.5 °F (36.9 °C)   Temp src Oral   SpO2 100 %   O2 Device None (Room air)       Current:/61   Pulse 83   Temp 98.5 °F (36.9 °C) (Oral)   Resp 18   SpO2 100%     Right Eye Chart Acuity: 20/40, Corrected  Left Eye Chart Acuity: 20/40, Corrected    PE:  General - Appears well, non-toxic and in NAD  Head - Appears symmetrical without deformity/swelling cranium, scalp, or facial bones  Eyes - PERRLA, sclera not injected, no discharge noted, subtle erythema noted to bilat upper eyelids, no pain/difficulty with EOM  Stain uptake on examination initial ulceration, abrasion or dendritic lesion.  Negative Mo's  ENT - EAC bilaterally without discharge, TM pearly grey with COL visualized appropriately bilaterally.   no nasal drainage noted in nares bilat, no cobblestoning to post. Pharynx.   Oropharynx clear, posterior pharynx is without erythema and without tonsilar enlargement or exudate, uvula midline, +gag, voice is clear. No trismus  Neck - supple with trachea midline  Resp - Lung sounds clear bilaterally and wob unlabored, good aeration with equal, even expansion bilaterally   CV - RRR  Skin - no rashes or petechiae noted, pink warm and dry throughout, mmm, cap refill <2seconds  Neuro - A&O x4, sensation equal to both medial and lateral aspects of extremities, steady gait  MSK - makes purposeful movements of all extremities  Psych - Interactive and appropriate      ED Course   Labs Reviewed - No data to display    MDM     DDx: Pleuritis versus conjunctivitis versus stye    General blepharitis instructions reviewed, antibiotic topical ointment and application instructions reviewed, warm compresses, follow-up and return/ED precautions reviewed.  Patient is historian and demonstrates understanding of all instruction and agrees with plan of care.      Disposition and Plan     Clinical Impression:  1. Blepharitis of upper eyelids of both eyes,  unspecified type        Disposition:  Discharge    Follow-up:  Jim Bojorquez MD  [address]  [address]  [facility] [address]  [phone]    Go in 1 week  As needed      Medications Prescribed:  Discharge Medication List as of 12/11/2024  6:19 PM        START taking these medications    Details   erythromycin 5 MG/GM Ophthalmic Ointment Apply 1 Application to eye every 6 (six) hours for 7 days., Normal, Disp-1 g, R-0

## (undated) NOTE — ED AVS SNAPSHOT
[facility] in [facility] [address]  [address]\""    Phone:  [phone]    Fax:  Natalie   MRN: [id number]    Department:  [facility] in [address] [place] [address]   Date of Visit: under your health insurance plan. Please contact your insurance company and physician's office to determine coverage and benefits available for follow-up care and referrals.      It is our goal to assure that you are completely satisfied with every aspect doctor until you can check with your doctor. Please bring the medication list to your next doctor's appointment. Any imaging studies and labs completed today can be reviewed in your [employer] account.   You may have had testing done that requires us to co Medicaid plans. To get signed up and covered, please call [phone] and ask to get set up for an insurance coverage that is in-network with [place] [address]. Dotty     Sign up for [facility], your secure online medical record.   [facility]

## (undated) NOTE — LETTER
Date: 2/11/2020      Patient Name: Ryann Haines        To Whom it may concern: This letter has been written at the patient's request. The above patient was seen at the [facility] for treatment of a medical condition.       This patien

## (undated) NOTE — LETTER
Date & Time: 10/10/2022, 11:37 AM  Patient: Fernando Lopez  Encounter Provider(s):    ZIGGY Holley       To Whom It May Concern:    Mahendra Sexton was seen and treated in our department on 10/10/2022. She may return to work 10/12/22.     If you have any questions or concerns, please do not hesitate to call.        _____________________________  Physician/APC Signature

## (undated) NOTE — LETTER
Date: 2/20/2020    Patient Name: Suzy Flores          To Whom it may concern: This letter has been written at the patient's request. The above patient was seen at the [facility] for treatment of a medical condition.     This patient sh

## (undated) NOTE — LETTER
Date: 2/26/2019    Patient Name: Kaci Bhat          To Whom it may concern: This letter has been written at the patient's request. The above patient was seen at the [facility] for treatment of a medical condition.     This patient sh

## (undated) NOTE — LETTER
Date: 2/26/2019    Patient Name: Young Martinez          To Whom it may concern: This letter has been written at the patient's request. The above patient was seen at the [facility] for treatment of a medical condition.     This patient sh

## (undated) NOTE — LETTER
[address]  [address] [facility]  Dept: [phone]  Dept Fax: [phone]  Loc: [phone]      May 29, 2017    Patient: Tori Pope   Date of Visit: 5/29/2017       To Whom It May Concern:    Waqas Cardoso